# Patient Record
Sex: FEMALE | Race: WHITE | NOT HISPANIC OR LATINO | Employment: FULL TIME | URBAN - METROPOLITAN AREA
[De-identification: names, ages, dates, MRNs, and addresses within clinical notes are randomized per-mention and may not be internally consistent; named-entity substitution may affect disease eponyms.]

---

## 2018-05-01 ENCOUNTER — HISTORICAL (OUTPATIENT)
Dept: ADMINISTRATIVE | Facility: HOSPITAL | Age: 48
End: 2018-05-01

## 2018-05-03 LAB — FINAL CULTURE: NO GROWTH

## 2018-10-10 ENCOUNTER — HISTORICAL (OUTPATIENT)
Dept: LAB | Facility: HOSPITAL | Age: 48
End: 2018-10-10

## 2018-10-25 ENCOUNTER — HISTORICAL (OUTPATIENT)
Dept: ADMINISTRATIVE | Facility: HOSPITAL | Age: 48
End: 2018-10-25

## 2019-04-16 ENCOUNTER — HISTORICAL (OUTPATIENT)
Dept: ADMINISTRATIVE | Facility: HOSPITAL | Age: 49
End: 2019-04-16

## 2019-04-16 LAB
ALBUMIN SERPL-MCNC: 4.6 GM/DL (ref 3.4–5)
ALBUMIN/GLOB SERPL: 1.77 {RATIO} (ref 1.5–2.5)
ALP SERPL-CCNC: 48 UNIT/L (ref 38–126)
ALT SERPL-CCNC: 11 UNIT/L (ref 7–52)
AST SERPL-CCNC: 13 UNIT/L (ref 15–37)
BILIRUB SERPL-MCNC: 0.6 MG/DL (ref 0.2–1)
BILIRUBIN DIRECT+TOT PNL SERPL-MCNC: 0.1 MG/DL (ref 0–0.5)
BILIRUBIN DIRECT+TOT PNL SERPL-MCNC: 0.5 MG/DL
BUN SERPL-MCNC: 11 MG/DL (ref 7–18)
CALCIUM SERPL-MCNC: 9.4 MG/DL (ref 8.5–10)
CHLORIDE SERPL-SCNC: 105 MMOL/L (ref 98–107)
CHOLEST SERPL-MCNC: 182 MG/DL (ref 0–200)
CHOLEST/HDLC SERPL: 4.2 {RATIO}
CO2 SERPL-SCNC: 28 MMOL/L (ref 21–32)
CREAT SERPL-MCNC: 0.77 MG/DL (ref 0.6–1.3)
DEPRECATED CALCIDIOL+CALCIFEROL SERPL-MC: 62.3 NG/ML (ref 30–80)
EST. AVERAGE GLUCOSE BLD GHB EST-MCNC: 105 MG/DL
GLOBULIN SER-MCNC: 2.6 GM/DL (ref 1.2–3)
GLUCOSE SERPL-MCNC: 114 MG/DL (ref 74–106)
HBA1C MFR BLD: 5.3 % (ref 4.4–6.4)
HDLC SERPL-MCNC: 43 MG/DL (ref 35–60)
LDLC SERPL CALC-MCNC: 111 MG/DL (ref 0–129)
POTASSIUM SERPL-SCNC: 4.8 MMOL/L (ref 3.5–5.1)
PROT SERPL-MCNC: 7.2 GM/DL (ref 6.4–8.2)
SODIUM SERPL-SCNC: 141 MMOL/L (ref 136–145)
TRIGL SERPL-MCNC: 169 MG/DL (ref 30–150)
VLDLC SERPL CALC-MCNC: 33.8 MG/DL

## 2021-05-19 ENCOUNTER — HISTORICAL (OUTPATIENT)
Dept: ADMINISTRATIVE | Facility: HOSPITAL | Age: 51
End: 2021-05-19

## 2021-05-19 LAB
T3FREE SERPL-MCNC: 2.33 PG/ML (ref 1.45–3.48)
T4 FREE SERPL-MCNC: 1.02 NG/DL (ref 0.76–1.46)
TSH SERPL-ACNC: 1.93 MIU/ML (ref 0.35–4.94)

## 2021-06-29 ENCOUNTER — HISTORICAL (OUTPATIENT)
Dept: ADMINISTRATIVE | Facility: HOSPITAL | Age: 51
End: 2021-06-29

## 2021-06-29 LAB
ALBUMIN SERPL-MCNC: 4.8 GM/DL (ref 3.4–5)
ALBUMIN/GLOB SERPL: 2.29 {RATIO} (ref 1.5–2.5)
ALP SERPL-CCNC: 51 UNIT/L (ref 38–126)
ALT SERPL-CCNC: 13 UNIT/L (ref 7–52)
AST SERPL-CCNC: 15 UNIT/L (ref 15–37)
BILIRUB SERPL-MCNC: 0.6 MG/DL (ref 0.2–1)
BILIRUBIN DIRECT+TOT PNL SERPL-MCNC: 0.1 MG/DL (ref 0–0.5)
BILIRUBIN DIRECT+TOT PNL SERPL-MCNC: 0.5 MG/DL
BUN SERPL-MCNC: 10 MG/DL (ref 7–18)
CALCIUM SERPL-MCNC: 9.8 MG/DL (ref 8.5–10)
CHLORIDE SERPL-SCNC: 103 MMOL/L (ref 98–107)
CHOLEST SERPL-MCNC: 159 MG/DL (ref 0–200)
CHOLEST/HDLC SERPL: 4.3 {RATIO}
CO2 SERPL-SCNC: 30 MMOL/L (ref 21–32)
CREAT SERPL-MCNC: 1.07 MG/DL (ref 0.6–1.3)
DEPRECATED CALCIDIOL+CALCIFEROL SERPL-MC: 57.8 NG/ML (ref 30–80)
EST CREAT CLEARANCE SER (OHS): 61.08 ML/MIN
GLOBULIN SER-MCNC: 2.1 GM/DL (ref 1.2–3)
GLUCOSE SERPL-MCNC: 131 MG/DL (ref 74–106)
HDLC SERPL-MCNC: 37 MG/DL (ref 35–60)
LDLC SERPL CALC-MCNC: 102 MG/DL (ref 0–129)
POTASSIUM SERPL-SCNC: 4.4 MMOL/L (ref 3.5–5.1)
PROT SERPL-MCNC: 6.9 GM/DL (ref 6.4–8.2)
SODIUM SERPL-SCNC: 142 MMOL/L (ref 136–145)
TRIGL SERPL-MCNC: 147 MG/DL (ref 30–150)
VLDLC SERPL CALC-MCNC: 29.4 MG/DL

## 2021-11-17 LAB — GLUCOSE SERPL-MCNC: 115 MG/DL (ref 70–110)

## 2022-04-07 ENCOUNTER — HISTORICAL (OUTPATIENT)
Dept: ADMINISTRATIVE | Facility: HOSPITAL | Age: 52
End: 2022-04-07

## 2022-04-24 VITALS
SYSTOLIC BLOOD PRESSURE: 124 MMHG | HEIGHT: 64 IN | WEIGHT: 133.81 LBS | OXYGEN SATURATION: 98 % | DIASTOLIC BLOOD PRESSURE: 72 MMHG | BODY MASS INDEX: 22.85 KG/M2

## 2022-05-01 NOTE — HISTORICAL [facility]
This is a historical note converted from Abelino. Formatting and pictures may have been removed.  Please reference Abelino for original formatting and attached multimedia. Chief Complaint  MED CHANGE  History of Present Illness  Patient reports?increased life stressors especially related to her mother?over the past 6 months.? She reports feeling as if she will have a panic attack often.? She is?more tired-taking a?2-hour nap?most days?with broken sleep?throughout the night.? She denies?symptoms of depression?or suicidal/harmful ideations.? She?enjoys?gardening and walking her dogs.??She says her  has been a?good supporter.? She does not?have much of a spiritual life but does?believe in God.  ?  She is using?half of a Valium?most days.? She denies any side effects with?Wellbutrin XL.  Review of Systems  General:???Denies weight change.??Denies fever,chills, night sweats, or weakness. ?Reports fatigue.  Cardiovascular:?? Denies chest pain, palpitations, dyspnea on exertion, orthopnea.  Respiratory:?? No cough, wheezing, shortness of breath, or sputum.  GI:?? Denies nausea, emesis, constipation, diarrhea, melena, hematochezia or abdominal pain  :?? No frequency, urgency, hematuria, discharge, or incontinence  MS:?? Denies myalgias, arthralgias, joint effusion, edema, or weakness  Neuro:?? No headaches, numbness in extremities, tingling, dizziness, or weakness  Psych:?? Denies?depression, suicidal or homicidal ideations, or irritability  Physical Exam  Vitals & Measurements  HR:?93(Peripheral)? BP:?127/78?  HT:?162?cm? HT:?162.00?cm? WT:?64.0?kg? WT:?64.000?kg? BMI:?24.39?  General:?? Well-developed and??nourished, no apparent distress, alert and oriented??4.  HEENT:?? PERRLA  Cardiovascular:?? Regular rhythm and rate, no murmurs, radial and dorsal pedal pulses 2+ bilaterally.  Respiratory:?? Lungs clear to auscultation bilaterally, no wheezes, no crackles, no rhonchi.??Good air movement.  Abdomen:?? NABS, soft,  nontender, no hepatosplenomegaly, no masses, no guarding or rebound.?  MS:?? No?CCE.  Neuro:?? CN II-XII intact_  Psych: Normal affect, patient calm, good historian, patient answers questions?appropriately. Good hygiene.  ?  Assessment/Plan  1.?Anxiety?F41.9  ?We will increase Wellbutrin to 300 mg daily #30x2 -?side effects discussed which included the possibility of developing suicidal or harmful ideations. ?Patient?verbalized understanding and will?stop the medication and?seek ER care if?sheexperiences these problems.  Valium?5 mg?#30x1?refilled-use sparingly.  We had a long discussion regarding lifestyle modifications to help her?cope?with the stressors in her life.? This included?nature walks,?meditation,?praying,?improving her diet,?and increasing?her water?intake.  She is scheduled for her 6-month follow-up?on?June 29, 2021. ?We will follow-up with her anxiety at that appointment.  Ordered:  buPROPion, 300 mg = 1 tab(s), Oral, Daily, # 30 tab(s), 2 Refill(s)  diazepam, See Instructions, TAKE 1 TABLET EVERY 8 HOURS AS NEEDED FOR ANXIETY - USE SPARINGLY, # 30 tab(s), 1 Refill(s)  Clinic Follow-up PRN, 05/19/21 14:02:00 CDT, [facility], Future Order  Office/Outpatient Visit Level 3 Established [id number] PC, Anxiety, [facility], 05/19/21 13:59:00 CDT  ?  2.?Hypothyroid?E03.9  ?Labs today: TSH, free T3, free T4  Ordered:  Free T4, Routine collect, 05/19/21 14:02:00 CDT, Blood, Order for future visit, Stop date 05/19/21 14:02:00 CDT, Lab Collect, Hypothyroid, 05/19/21 14:02:00 CDT  Lab Collection Request, 05/19/21 14:02:00 CDT, [facility], 05/19/21 14:02:00 CDT, Hypothyroid  T3 Free, Routine collect, 05/19/21 14:02:00 CDT, Blood, Order for future visit, Stop date 05/19/21 14:02:00 CDT, Lab Collect, Hypothyroid, 05/19/21 14:02:00 CDT  Thyroid Stimulating Hormone, Routine collect, 05/19/21 14:02:00 CDT, Blood, Order for future visit, Stop date 05/19/21 14:02:00 CDT, Lab Collect, Hypothyroid, 05/19/21  14:02:00 CDT  ?  Referrals  Clinic Follow-up PRN, 05/19/21 14:02:00 CDT, [facility], Future Order   Problem List/Past Medical History  Ongoing  Anxiety  Elevated glucose  Hypercholesteremia  Hypothyroid  Migraine  Vitamin D deficiency  Wellness examination  Historical  No qualifying data  Procedure/Surgical History  Lumpectomy  Sinusotomy of maxillary sinus by intranasal approach  Thyroidectomy   Medications  DIAZepam 5 mg oral tablet, See Instructions, 1 refills  ethinyl estradiol-norethindrone 5 mcg-1 mg oral tablet, 1 tab(s), Oral, Daily  Flexeril 5 mg oral tablet, See Instructions  levothyroxine 75 mcg (0.075 mg) oral tablet, 75 mcg= 1 tab(s), Oral, qAM, 1 refills  rosuvastatin 10 mg oral tablet, 10 mg= 1 tab(s), Oral, Daily, 1 refills  SUMAtriptan 50 mg oral tablet, 50 mg= 1 tab(s), Oral, Daily, 11 refills  Wellbutrin  mg/24 hours oral tablet, extended release, 300 mg= 1 tab(s), Oral, Daily, 2 refills  Allergies  penicillins?(rash)  sulfa drugs?(rash)  Social History  Abuse/Neglect  No, 05/19/2021  No, 12/29/2020  No, 12/03/2020  No, 10/16/2019  Alcohol  Current, Beer, 1-2 times per week, 05/16/2019  Employment/School  Employed, Work/School description: TEACHER., 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  Heart murmur: Mother.  Hypercholesterolaemia: Mother, Father and Brother.  Hypertension.: Mother, Father and Brother.  Rheumatoid arthritis: Mother.  Stroke: Father.  Health Maintenance  Health Maintenance  ???Pending?(in the next year)  ???  ??OverDue  ??? ? ? ?Influenza Vaccine due??10/01/20??and every 1??day(s)  ??? ? ? ?Alcohol Misuse Screening due??01/02/21??and every 1??year(s)  ??? ??Due?  ??? ? ? ?ADL Screening due??05/19/21??and every 1??year(s)  ??? ? ? ?Colorectal Screening due??05/19/21??Unknown Frequency  ??? ? ? ?Depression Screening due??05/19/21??Unknown Frequency  ??? ? ? ?Tetanus Vaccine due??05/19/21??and every 10??year(s)  ??? ? ? ?Zoster Vaccine due??05/19/21??Unknown Frequency  ??? ??Due In Future?  ??? ? ? ?Obesity Screening not due until??01/01/22??and every 1??year(s)  ???Satisfied?(in the past 1 year)  ??? ??Satisfied?  ??? ? ? ?Blood Pressure Screening on??05/19/21.??Satisfied by Alex Burnham  ??? ? ? ?Body Mass Index Check on??05/19/21.??Satisfied by Alex Burnham  ??? ? ? ?Breast Cancer Screening on??02/25/21.??Satisfied by Soo Nevarez  ??? ? ? ?Cervical Cancer Screening on??06/15/20.??Satisfied by Rcahele Taylor  ??? ? ? ?Diabetes Screening on??12/23/20.??Satisfied by Avtar Phoenix  ??? ? ? ?Lipid Screening on??12/23/20.??Satisfied by Avtar Phoenix  ??? ? ? ?Obesity Screening on??05/19/21.??Satisfied by Alex Burnham  ?      Patient condition discussed?in detail with nurse practitioner.??Agree with plan of care?and follow-up.  ?

## 2022-05-01 NOTE — HISTORICAL [facility]
This is a historical note converted from Abelino. Formatting and pictures may have been removed.  Please reference Abelino for original formatting and attached multimedia. Chief Complaint  6 MTH RECHECK  History of Present Illness  Patient presents for 6-month follow-up.  In May we increased her Wellbutrin to 300 mg.? Helped focus her thoughts. Has to take at night. Minimal sleep problem which resolved with melatonin. The increased stress regarding her mother has not changed but patient is getting better at setting boundaries.  Review of Systems  General:??????????????? Patient reports energy level is good.??Denies fever,chills, night sweats, fatigue or weakness.  HEENT:?????????????????? Denies vision changes or eye pain.? No sore throat, ear pain, sinus pressure or discharge.  Cardiovascular:??? Denies chest pain, palpitations, dyspnea on exertion, orthopnea.  Respiratory:???????? No cough, wheezing, shortness of breath, or sputum.  GI:????????????????????????? Denies nausea, emesis, constipation, diarrhea, melena, hematochezia or abdominal pain  :??????????????????????? No frequency, urgency, hematuria, discharge, or incontinence  MS:?????????????????????? Denies myalgias, arthralgias, joint effusion, edema, or weakness  Neuro:????????????????? No headaches, numbness in extremities, tingling, dizziness, or weakness  Psych:?????????????????? Mood is well controlled. ?Denies suicidal or homicidal ideations, or irritability  Endo:??????????????????? Denies polyuria, polydipsia, polyphagia  Heme:????????????????? No abnormal bleeding or bruising. No lymph node enlargement or pain.  Physical Exam  Vitals & Measurements  HR:?83(Peripheral)? BP:?132/80?  HT:?166.00?cm? HT:?166?cm? WT:?62.200?kg? WT:?62.2?kg? BMI:?22.57?  General :?????Well-developed and? nourished, no apparent distress, alert and oriented ?4  HEENT:????? TMs and EACs within normal limits,?nasal mucosa within normal limits?with no discharge,?oropharynx  clear  Integument :????? Skin is intact with no erythema.? No pustules or vesicles.? No rash or scale. No Lymphadenopathy.? No urticaria.? No abnormal nevi  Neck :?????Supple, no lymphadenopathy, no thyromegaly, no bruits, no jugular venous distention  Cardiovascular :?????? Regular rhythm and rate, no murmurs, radial and dorsal pedal pulses 2+ bilaterally  Respiratory :??????Lungs clear to auscultation bilaterally, no wheezes, no crackles, no rhonchi.? Good air movement  Abdomen :?????? ?NABS, soft, nontender, no hepatosplenomegaly, no masses, no guarding or rebound????????????????????????  Ext:??????? No muscle tenderness, joints WNL, FROM, negative SLR, no?CCE  Neuro :? ?????? CN II-XII intact, reflexes 2+ throughout, no motor sensory deficits, negative?cerebellar? tests  Heme :?????? No bruising or petechiae?  Back:??????? no CVAT  Assessment/Plan  1.?Hypercholesteremia?E78.00  ?Refilled rosuvastatin 10 mg for 6 months.? Check a CMP and lipids.  2.?Hypothyroid?E03.9  ?Thyroid function tests were?normal in May.? Refill levothyroxine 75 mcg for 6 months.  3.?Anxiety?F41.9  ?Mood is well controlled. ?Refill Wellbutrin  mg for 6 months.? Refill diazepam 5 mg, #30 with 1 refill.  4.?Vitamin D deficiency?E55.9  ?We will check a vitamin D level.  Referrals  Clinic Follow up, *Est. 12/30/21 3:00:00 CST, CPX in 6 months, CPX in 6 months, Order for future visit, Hypercholesteremia  Hypothyroid  Anxiety  Vitamin D deficiency, [facility]  Clinic Follow up, Order for future visit   Problem List/Past Medical History  Ongoing  Anxiety  Elevated glucose  Hypercholesteremia  Hypothyroid  Migraine  Vitamin D deficiency  Wellness examination  Historical  No qualifying data  Procedure/Surgical History  Lumpectomy  Sinusotomy of maxillary sinus by intranasal approach  Thyroidectomy   Medications  DIAZepam 5 mg oral tablet, See Instructions, 1 refills  ethinyl estradiol-norethindrone 5 mcg-1 mg oral tablet, 1 tab(s), Oral,  Daily  Flexeril 5 mg oral tablet, See Instructions  levothyroxine 75 mcg (0.075 mg) oral tablet, 75 mcg= 1 tab(s), Oral, qAM, 1 refills  rosuvastatin 10 mg oral tablet, See Instructions, 1 refills  SUMAtriptan 50 mg oral tablet, 50 mg= 1 tab(s), Oral, Daily, 11 refills  Wellbutrin  mg/24 hours oral tablet, extended release, 300 mg= 1 tab(s), Oral, Daily, 1 refills  Allergies  penicillins?(rash)  sulfa drugs?(rash)  Social History  Abuse/Neglect  No, 06/29/2021  No, 05/19/2021  No, 12/29/2020  No, 12/03/2020  No, 10/16/2019  Alcohol  Current, Beer, 1-2 times per week, 05/16/2019  Employment/School  Employed, Work/School description: TEACHER., 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  Heart murmur: Mother.  Hypercholesterolaemia: Mother, Father and Brother.  Hypertension.: Mother, Father and Brother.  Rheumatoid arthritis: Mother.  Stroke: Father.  Health Maintenance  Health Maintenance  ???Pending?(in the next year)  ??? ??OverDue  ??? ? ? ?Alcohol Misuse Screening due??01/02/21??and every 1??year(s)  ??? ??Due?  ??? ? ? ?ADL Screening due??07/07/21??and every 1??year(s)  ??? ? ? ?Colorectal Screening due??07/07/21??Unknown Frequency  ??? ? ? ?Depression Screening due??07/07/21??Unknown Frequency  ??? ? ? ?Tetanus Vaccine due??07/07/21??and every 10??year(s)  ??? ? ? ?Zoster Vaccine due??07/07/21??Unknown Frequency  ??? ??Due In Future?  ??? ? ? ?Influenza Vaccine not due until??10/01/21??and every  1??day(s)  ??? ? ? ?Obesity Screening not due until??01/01/22??and every 1??year(s)  ??? ? ? ?Blood Pressure Screening not due until??06/29/22??and every 1??year(s)  ??? ? ? ?Body Mass Index Check not due until??06/29/22??and every 1??year(s)  ???Satisfied?(in the past 1 year)  ??? ??Satisfied?  ??? ? ? ?Blood Pressure Screening on??06/29/21.??Satisfied by Mary Kay Davis CMA S.  ??? ? ? ?Body Mass Index Check on??06/29/21.??Satisfied by Mary Kay Davis CMA S.  ??? ? ? ?Breast Cancer Screening on??02/25/21.??Satisfied by Soo Nevarez  ??? ? ? ?Diabetes Screening on??06/29/21.??Satisfied by Avtar Phoenix  ??? ? ? ?Lipid Screening on??06/29/21.??Satisfied by Avtar Phoenix  ??? ? ? ?Obesity Screening on??06/29/21.??Satisfied by Mary Kay Davis CMA [facility]  ?  Lab Results  Test Name Test Result Date/Time   T3 Free 2.33 pg/mL 05/19/2021 14:07 CDT   T4 Free 1.02 ng/dL 05/19/2021 14:07 CDT   TSH 1.929 mIU/mL 05/19/2021 14:07 CDT

## 2022-05-01 NOTE — HISTORICAL [facility]
This is a historical note converted from Abelino. Formatting and pictures may have been removed.  Please reference Abelino for original formatting and attached multimedia. Chief Complaint  6 month F/U  History of Present Illness  Patient here for six-month follow-up.? Denies any side effects to current medications.? Tolerating current meds and?feels that they are effective.? Denies change in social or family history.? No new complaints.? No exercise, no nicotine. Etoh 2 beers per week.  Sees Dr Murray for Hypothyroid. He will repeat TFTS in 2 weeks.  Sees Dr Pollard this afternoon.  GYN: Morgan.  Review of Systems  General:?Takes 2-3 half Valium tab per week.? Sleep is good. Good energy level.? Getting migraines monthly. coincides with cycle. Has had endometrial ablation.  HEENT:?????????????????? Denies vision changes or eye pain.? No sore throat, ear pain, sinus pressure or discharge.  Cardiovascular:??? Denies chest pain, palpitations, dyspnea on exertion, orthopnea.  Respiratory:???????? No cough, wheezing, shortness of breath, or sputum.  GI:????????????????????????? Denies nausea, emesis, constipation, diarrhea, melena, hematochezia or abdominal pain  :??????????????????????? No frequency, urgency, hematuria, discharge, or incontinence  MS:?????????????????????? Denies myalgias, arthralgias, joint effusion, edema, or weakness  Neuro:????????????????? No headaches, numbness in extremities, tingling, dizziness, or weakness  Psych:?????????????????? Denies anxiety, depression, suicidal or homicidal ideations, or irritability  Endo:??????????????????? Denies polyuria, polydipsia, polyphagia  Heme:????????????????? No abnormal bleeding or bruising. No lymph node enlargement or pain.  Physical Exam  Vitals & Measurements  HR:?70(Peripheral)? BP:?128/78?  HT:?165.56?cm? WT:?61.7?kg? BMI:?22.51?  General :?????Well-developed and? nourished, no apparent distress, alert and oriented ?4  HEENT:????? TMs and EACs within  normal limits,?nasal mucosa within normal limits?with no discharge,?oropharynx clear  Integument :????? Skin is intact with no erythema.? No pustules or vesicles.? No rash or scale. No Lymphadenopathy.? No urticaria.? No abnormal nevi  Neck :?????Supple, no lymphadenopathy, no thyromegaly, no bruits, no jugular venous distention  Cardiovascular :?????? Regular rhythm and rate, no murmurs, radial and dorsal pedal pulses 2+ bilaterally  Respiratory :??????Lungs clear to auscultation bilaterally, no wheezes, no crackles, no rhonchi.? Good air movement  Abdomen :?????? ?NABS, soft, nontender, no hepatosplenomegaly, no masses, no guarding or rebound????????????????????????  Ext:??????? No muscle tenderness, joints WNL, FROM, negative SLR, no?CCE  Neuro :? ?????? CN II-XII intact, reflexes 2+ throughout, no motor sensory deficits, negative?cerebellar? tests  Heme :?????? No bruising or petechiae?  Back:??????? no CVAT  Assessment/Plan  1.?Hypercholesteremia  ?We will check a CMP and lipids. ?Refilled Crestor for 6 months.  Ordered:  Clinic Follow up, *Est. 10/16/19 8:30:00 CDT, CPX in 6 months, Order for future visit, Vitamin D deficiency, [facility]  Office/Outpatient Visit Level 3 Established [id number] PC, Hypercholesteremia  Anxiety  Elevated glucose  Vitamin D deficiency, [facility], 04/16/19 12:08:00 CDT  ?  2.?Anxiety  ?Refilled diazepam and?Wellbutrin for 6 months  Ordered:  Clinic Follow up, *Est. 10/16/19 8:30:00 CDT, CPX in 6 months, Order for future visit, Vitamin D deficiency, [facility]  Office/Outpatient Visit Level 3 Established [id number] PC, Hypercholesteremia  Anxiety  Elevated glucose  Vitamin D deficiency, [facility], 04/16/19 12:08:00 CDT  ?  3.?Elevated glucose  ?We will check an A1c. ?Instructed?on weight?loss and diet.? Encouraged to increase aerobic exercise.  Ordered:  Clinic Follow up, *Est. 10/16/19 8:30:00 CDTDIANNA in 6 months, Order for future visit, Vitamin D deficiency, [facility]  Office/Outpatient Visit Level 3 Established [id number] PC, Hypercholesteremia  Anxiety  Elevated glucose  Vitamin D deficiency, [facility], 04/16/19 12:08:00 CDT  ?  4.?Vitamin D deficiency  We will check a vitamin D.  Ordered:  Clinic Follow up, *Est. 10/16/19 8:30:00 CDT, CPX in 6 months, Order for future visit, Vitamin D deficiency, [facility]  Office/Outpatient Visit Level 3 Established [id number] PC, Hypercholesteremia  Anxiety  Elevated glucose  Vitamin D deficiency, [facility], 04/16/19 12:08:00 CDT  Vitamin D, 25-Hydroxy Level, Routine collect, 04/16/19 12:10:00 CDT, Blood, Stop date 04/16/19 12:10:00 CDT, Lab Collect, Vitamin D deficiency, 04/16/19 12:10:00 CDT  ?  Orders:  buPROPion, See Instructions, TAKE ONE TABLET BY MOUTH EVERY 24 HOURS, # 30 tab(s), 5 Refill(s), Pharmacy: [facility]  diazepam, See Instructions, TAKE 1 TABLET EVERY 8 HOURS AS NEEDED FOR ANXIETY - USE SPARINGLY, # 90 tab(s), 1 Refill(s), Pharmacy: [facility]  rosuvastatin, See Instructions, TAKE 1 TABLET ONCE DAILY, # 30 tab(s), 5 Refill(s), Pharmacy: [facility]   Problem List/Past Medical History  Ongoing  Anxiety  Hypercholesteremia  Migraine  Vitamin D deficiency  Wellness examination  Historical  No qualifying data  Medications  buPROPion 150 mg/24 hours (XL) oral tablet, extended release, See Instructions, 5 refills  DIAZepam 5 mg oral tablet, See Instructions, 1 refills  ethinyl estradiol-norethindrone 5 mcg-1 mg oral tablet, 1 tab(s), Oral, Daily  Flexeril 5 mg oral tablet, See Instructions,? ?Not taking  levothyroxine 75 mcg (0.075 mg) oral tablet, 75 mcg= 1 tab(s), Oral, qAM  LEVOTHYROXINE 88 MCG TABLET, 88 mcg= 1 tab(s), Oral, Daily,? ?Not taking  rosuvastatin 10 mg oral tablet, See Instructions, 5 refills  SUMATRIPTAN SUCC 50 MG TAB, 50 mg= 1 tab(s), Oral, Daily  Allergies  penicillins?(rash)  sulfa drugs?(rash)  Social History  Tobacco  Never (less than 100 in lifetime),  N/A, 04/16/2019  Never smoker, N/A, 12/20/2018  Family History  Diabetes mellitus type 2: Brother.  Heart murmur: Mother.  Hypercholesterolaemia: Mother, Father and Brother.  Hypertension.: Mother, Father and Brother.  Rheumatoid arthritis: Mother.  Stroke: Father.  Health Maintenance  Health Maintenance  ???Pending?(in the next year)  ??? ??OverDue  ??? ? ? ?Diabetes Screening due??and every?  ??? ??Due?  ??? ? ? ?ADL Screening due??04/16/19??and every 1??year(s)  ??? ? ? ?Alcohol Misuse Screening due??04/16/19??and every 1??year(s)  ??? ? ? ?Depression Screening due??04/16/19??and every?  ??? ? ? ?Tetanus Vaccine due??04/16/19??and every 10??year(s)  ??? ??Due In Future?  ??? ? ? ?Blood Pressure Screening not due until??04/15/20??and every 1??year(s)  ??? ? ? ?Body Mass Index Check not due until??04/15/20??and every 1??year(s)  ???Satisfied?(in the past 1 year)  ??? ??Satisfied?  ??? ? ? ?Blood Pressure Screening on??04/16/19.??Satisfied by Seema Gar CMA  ??? ? ? ?Body Mass Index Check on??04/16/19.??Satisfied by Seema Gar CMA  ??? ? ? ?Cervical Cancer Screening on??05/04/18.??Satisfied by Ngozi Rebolledo.  ??? ? ? ?Diabetes Screening on??04/16/19.??Satisfied by Avtar Phoenix  ??? ? ? ?Influenza Vaccine on??12/20/18.??Satisfied by Marilyn Agarwal LPN  ??? ? ? ?Lipid Screening on??04/16/19.??Satisfied by Avtar Phoenix  ??? ? ? ?Obesity Screening on??04/16/19.??Satisfied by Seema Gar CMA  ?  ?

## 2022-07-05 PROBLEM — E55.9 VITAMIN D DEFICIENCY: Status: ACTIVE | Noted: 2022-07-05

## 2022-07-05 PROBLEM — E03.9 HYPOTHYROIDISM: Status: ACTIVE | Noted: 2022-07-05

## 2022-07-05 PROBLEM — F41.9 ANXIETY: Status: ACTIVE | Noted: 2022-07-05

## 2022-07-05 PROBLEM — G43.909 MIGRAINE HEADACHE: Status: ACTIVE | Noted: 2022-07-05

## 2022-07-05 PROBLEM — E78.00 HYPERCHOLESTEROLEMIA: Status: ACTIVE | Noted: 2022-07-05

## 2023-07-10 PROBLEM — M62.838 MUSCLE SPASM: Status: ACTIVE | Noted: 2023-07-10

## 2023-07-10 PROBLEM — M25.50 ARTHRALGIA: Status: ACTIVE | Noted: 2023-07-10

## 2024-08-01 PROCEDURE — 83001 ASSAY OF GONADOTROPIN (FSH): CPT | Performed by: FAMILY MEDICINE
